# Patient Record
Sex: FEMALE
[De-identification: names, ages, dates, MRNs, and addresses within clinical notes are randomized per-mention and may not be internally consistent; named-entity substitution may affect disease eponyms.]

---

## 2017-02-27 PROBLEM — Z00.00 ENCOUNTER FOR PREVENTIVE HEALTH EXAMINATION: Status: ACTIVE | Noted: 2017-02-27

## 2017-03-22 ENCOUNTER — APPOINTMENT (OUTPATIENT)
Dept: CARDIOLOGY | Facility: CLINIC | Age: 82
End: 2017-03-22

## 2017-07-21 ENCOUNTER — APPOINTMENT (OUTPATIENT)
Dept: CARDIOLOGY | Facility: CLINIC | Age: 82
End: 2017-07-21

## 2017-08-02 ENCOUNTER — APPOINTMENT (OUTPATIENT)
Dept: CARDIOLOGY | Facility: CLINIC | Age: 82
End: 2017-08-02
Payer: MEDICARE

## 2017-08-02 PROCEDURE — 99214 OFFICE O/P EST MOD 30 MIN: CPT

## 2017-10-04 ENCOUNTER — APPOINTMENT (OUTPATIENT)
Dept: CARDIOLOGY | Facility: CLINIC | Age: 82
End: 2017-10-04
Payer: MEDICARE

## 2017-10-04 PROCEDURE — 85610 PROTHROMBIN TIME: CPT | Mod: QW

## 2017-10-04 PROCEDURE — 99214 OFFICE O/P EST MOD 30 MIN: CPT

## 2017-10-04 PROCEDURE — 93306 TTE W/DOPPLER COMPLETE: CPT

## 2017-10-04 PROCEDURE — 93880 EXTRACRANIAL BILAT STUDY: CPT

## 2018-04-25 ENCOUNTER — APPOINTMENT (OUTPATIENT)
Dept: CARDIOLOGY | Facility: CLINIC | Age: 83
End: 2018-04-25
Payer: MEDICARE

## 2018-04-25 ENCOUNTER — NON-APPOINTMENT (OUTPATIENT)
Age: 83
End: 2018-04-25

## 2018-04-25 VITALS
OXYGEN SATURATION: 97 % | BODY MASS INDEX: 20.41 KG/M2 | SYSTOLIC BLOOD PRESSURE: 94 MMHG | DIASTOLIC BLOOD PRESSURE: 60 MMHG | HEART RATE: 96 BPM | WEIGHT: 127 LBS | RESPIRATION RATE: 16 BRPM | HEIGHT: 66 IN

## 2018-04-25 DIAGNOSIS — Z86.79 PERSONAL HISTORY OF OTHER DISEASES OF THE CIRCULATORY SYSTEM: ICD-10-CM

## 2018-04-25 DIAGNOSIS — Z87.898 PERSONAL HISTORY OF OTHER SPECIFIED CONDITIONS: ICD-10-CM

## 2018-04-25 DIAGNOSIS — I65.29 OCCLUSION AND STENOSIS OF UNSPECIFIED CAROTID ARTERY: ICD-10-CM

## 2018-04-25 DIAGNOSIS — Z86.59 PERSONAL HISTORY OF OTHER MENTAL AND BEHAVIORAL DISORDERS: ICD-10-CM

## 2018-04-25 DIAGNOSIS — Z86.39 PERSONAL HISTORY OF OTHER ENDOCRINE, NUTRITIONAL AND METABOLIC DISEASE: ICD-10-CM

## 2018-04-25 DIAGNOSIS — Z82.49 FAMILY HISTORY OF ISCHEMIC HEART DISEASE AND OTHER DISEASES OF THE CIRCULATORY SYSTEM: ICD-10-CM

## 2018-04-25 DIAGNOSIS — Z80.6 FAMILY HISTORY OF LEUKEMIA: ICD-10-CM

## 2018-04-25 DIAGNOSIS — Z87.891 PERSONAL HISTORY OF NICOTINE DEPENDENCE: ICD-10-CM

## 2018-04-25 DIAGNOSIS — E03.9 HYPOTHYROIDISM, UNSPECIFIED: ICD-10-CM

## 2018-04-25 PROCEDURE — 93000 ELECTROCARDIOGRAM COMPLETE: CPT

## 2018-04-25 PROCEDURE — 99214 OFFICE O/P EST MOD 30 MIN: CPT

## 2018-04-25 RX ORDER — METFORMIN ER 500 MG 500 MG/1
500 TABLET ORAL
Qty: 90 | Refills: 0 | Status: ACTIVE | COMMUNITY
Start: 2017-11-22

## 2018-10-24 ENCOUNTER — APPOINTMENT (OUTPATIENT)
Dept: CARDIOLOGY | Facility: CLINIC | Age: 83
End: 2018-10-24
Payer: MEDICARE

## 2018-10-24 VITALS
BODY MASS INDEX: 20.57 KG/M2 | HEART RATE: 80 BPM | WEIGHT: 128 LBS | DIASTOLIC BLOOD PRESSURE: 66 MMHG | SYSTOLIC BLOOD PRESSURE: 100 MMHG | HEIGHT: 66 IN

## 2018-10-24 PROCEDURE — 99214 OFFICE O/P EST MOD 30 MIN: CPT

## 2019-04-24 ENCOUNTER — NON-APPOINTMENT (OUTPATIENT)
Age: 84
End: 2019-04-24

## 2019-04-24 ENCOUNTER — APPOINTMENT (OUTPATIENT)
Dept: CARDIOLOGY | Facility: CLINIC | Age: 84
End: 2019-04-24
Payer: MEDICARE

## 2019-04-24 VITALS
DIASTOLIC BLOOD PRESSURE: 50 MMHG | WEIGHT: 130 LBS | HEART RATE: 77 BPM | BODY MASS INDEX: 20.89 KG/M2 | HEIGHT: 66 IN | SYSTOLIC BLOOD PRESSURE: 90 MMHG

## 2019-04-24 PROCEDURE — 99215 OFFICE O/P EST HI 40 MIN: CPT

## 2019-04-24 PROCEDURE — 93000 ELECTROCARDIOGRAM COMPLETE: CPT

## 2019-04-24 PROCEDURE — 93306 TTE W/DOPPLER COMPLETE: CPT

## 2019-04-24 NOTE — ASSESSMENT
[FreeTextEntry1] : \par past tests for reference: \par \par Carotid Doppler study. In October 4, 2017. Nonobstructive disease.\par Echocardiogram  October 4, 2017. Preserved ejection fraction. Mild worsening of aortic stenosis and increased pulmonary pressures. Stable mitral regurgitation\par \par  Reviewed labs from July 7, 2017. INR 2.7. CBC is stable. Creatinine 0.98. Sodium 139. Potassium 5.0.\par \par Echocardiogram.  8/5/2015 EF 55%.  Biatrial enlargement.  Right ventricular enlargement.  Mild mitral regurgitation mild to moderate tricuspid regurgitation mild aortic stenosis.  Overall no significant change compared to before.\par \par Echocardiogram 9/7/2016 EF 55%. Biatrial enlargement. Right ventricular enlargement. Normal LV RV function. Mild mitral regurgitation. Moderate tricuspid consideration. Mean aortic gradient 10 mmHg. Aortic valve area 1.37 m².\par \par On April 25 2018 ndication atrial fibrillation. Interpretation by me. Her EKG. Atrial fibrillation. Biventricular hypertrophy. ST-T wave changes, nonspecific.\par \par Reviewed on April 24, 2019\par EKG April 24, 2019. Atrial fibrillation, LVH. Premature ventricular beat. Anteroseptal infarct of undetermined age. Nonspecific ST-T changes.\par Echocardiogram. April 24, 2019 and ejection fraction 65%. Severe aortic stenosis. Dilated aortic root. Biatrial enlargement. Mild pulmonary hypertension.\par

## 2019-04-24 NOTE — PHYSICAL EXAM
[Normal Appearance] : normal appearance [General Appearance - Well Developed] : well developed [Well Groomed] : well groomed [General Appearance - Well Nourished] : well nourished [No Deformities] : no deformities [General Appearance - In No Acute Distress] : no acute distress [Normal Conjunctiva] : the conjunctiva exhibited no abnormalities [No Oral Pallor] : no oral pallor [Normal Jugular Venous A Waves Present] : normal jugular venous A waves present [Normal Jugular Venous V Waves Present] : normal jugular venous V waves present [No Jugular Venous Nicole A Waves] : no jugular venous nicole A waves [Respiration, Rhythm And Depth] : normal respiratory rhythm and effort [Exaggerated Use Of Accessory Muscles For Inspiration] : no accessory muscle use [Auscultation Breath Sounds / Voice Sounds] : lungs were clear to auscultation bilaterally [Heart Sounds] : normal S1 and S2 [Veins - Varicosity Changes] : no varicosital changes were noted in the lower extremities [Abdomen Soft] : soft [FreeTextEntry1] : walks with a walker [Cyanosis, Localized] : no localized cyanosis [Nail Clubbing] : no clubbing of the fingernails [] : no rash [Oriented To Time, Place, And Person] : oriented to person, place, and time [Affect] : the affect was normal [No Anxiety] : not feeling anxious [Mood] : the mood was normal

## 2019-04-24 NOTE — HISTORY OF PRESENT ILLNESS
[FreeTextEntry1] : \par other medical problems as noted\par  \par #1 chronic edema. Related to venous insufficiency as well as possible HFpEF. Stable. Improves in the morning. Increases in the evening. Not associated with any pain. No skin changes. Non-progressive. Stable on present regimen of medication. Unable to use compression stockings.\par \par #2 chronic atrial fibrillation. Rate control. On anticoagulation. Asymptomatic. No bleeding complication.\par \par #3 atherosclerotic coronary artery disease. Native vessel. Asymptomatic. Nonsmoker. On anticoagulation. No history of MI. Preserved LV systolic function. Not on beta blockers. Stable weight. Nonsmoker.\par \par #4 type 2 diabetes mellitus. uncomplicated\par \par #5 dyslipidemia.  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

## 2019-04-24 NOTE — REVIEW OF SYSTEMS
[Feeling Fatigued] : feeling fatigued [Dyspnea on exertion] : dyspnea during exertion [Shortness Of Breath] : shortness of breath [Chest  Pressure] : no chest pressure [Chest Pain] : no chest pain [Lower Ext Edema] : lower extremity edema [Leg Claudication] : no intermittent leg claudication [Palpitations] : no palpitations [Joint Pain] : joint pain [Joint Stiffness] : joint stiffness [Negative] : Endocrine

## 2019-04-24 NOTE — REASON FOR VISIT
[Follow-Up - Clinic] : a clinic follow-up of [Hyperlipidemia] : hyperlipidemia [Atrial Fibrillation] : atrial fibrillation [Hypertension] : hypertension [FreeTextEntry1] : 89-year-old comes in for a followup consultation Review echocardiogram\par Improved ankle edema.\par She has no PND, orthopnea.\par No palpitation, dizziness, chest pain \par Continued fatigue and tiredness. Moderate severity. Daytime sleepiness. Urinary frequency during nighttime does not lead to sleep.  Her shortness of breath is stable. There is no PND or orthopnea. She has no palpitation. She has mild postural dizziness, which resolves on its own after standing for a few minutes. She uses a walker to walk. Her weight is stable. Diet is stable. There is no bleeding complications. No recent hospital admission.

## 2019-04-24 NOTE — DISCUSSION/SUMMARY
[FreeTextEntry1] : #1IReviewed echocardiogram and EKG returned.\par Reviewed severe aortic stenosis, and pathophysiology, recurrent.\par Discuss medical management vs invasive evaluation followed by either percutaneous or open heart procedure for valve replacement.\par Discussed mortality under medical management versus complications and other morbidity, mortality related to invasive intervention X. line. She is decided with continued medical management.\par #2 chronic atrial fibrillation. Weight is stable. Continue diltiazem 120 mg p.o. continue warfarin. Watch for bleeding  I agree between 2 and 3.\par #3 hypertensive heart disease. Stable chronic edema. Follow labs. Continue present medications.\par #4 mild to moderate mitral regurgitation. Aortic stenosis. His elderly systolic function. Mild pulmonary hypertension. Moderate tricuspid regurgitation. Clinically stable. Continue low salt diet. Spironolactone. Takes furosemide intermittently.\par #5 diaabtes controlled.\par \par Counseling regarding low saturated fat, salt and carbohydrate intake was reviewed. Active lifestyle and regular. Exercise along with weight management is advised.\par \par I have reviewed above at length. I answered all the questions. Patient verbalized understanding\par Thank you very much for allowing me to participate in your patient's care. Please feel free to call me for any questions.\par Follow up in 6 months

## 2019-04-26 ENCOUNTER — APPOINTMENT (OUTPATIENT)
Dept: CARDIOLOGY | Facility: CLINIC | Age: 84
End: 2019-04-26

## 2019-10-30 ENCOUNTER — APPOINTMENT (OUTPATIENT)
Dept: CARDIOLOGY | Facility: CLINIC | Age: 84
End: 2019-10-30
Payer: MEDICARE

## 2019-10-30 VITALS
BODY MASS INDEX: 20.57 KG/M2 | HEIGHT: 66 IN | DIASTOLIC BLOOD PRESSURE: 62 MMHG | HEART RATE: 82 BPM | SYSTOLIC BLOOD PRESSURE: 110 MMHG | OXYGEN SATURATION: 96 % | WEIGHT: 128 LBS

## 2019-10-30 PROCEDURE — 99214 OFFICE O/P EST MOD 30 MIN: CPT

## 2019-10-30 NOTE — DISCUSSION/SUMMARY
[FreeTextEntry1] : #1Reviewed severe aortic stenosis, and pathophysiology, recurrent.\par She continued to maintain management plan as medical management. Understanding worsening congestive heart failure, syncope, and death\par #2 chronic atrial fibrillation. Weight is stable. Continue diltiazem 120 mg p.o. continue warfarin. Watch for bleeding  I agree between 2 and 3.\par #3 hypertensive heart disease. Stable chronic edema. Follow labs. Continue present medications.\par #4 mild to moderate mitral regurgitation. Aortic stenosis. His elderly systolic function. Mild pulmonary hypertension. Moderate tricuspid regurgitation. Clinically stable. Continue low salt diet. Spironolactone. Takes furosemide intermittently.\par #5 diabetes controlled.\par Labs ordered\par \par Counseling regarding low saturated fat, salt and carbohydrate intake was reviewed. Active lifestyle and regular. Exercise along with weight management is advised.\par \par I have reviewed above at length. I answered all the questions. Patient verbalized understanding\par Thank you very much for allowing me to participate in your patient's care. Please feel free to call me for any questions.\par Follow up in 6 months

## 2019-10-30 NOTE — HISTORY OF PRESENT ILLNESS
[FreeTextEntry1] : \par other medical problems as noted\par  \par #1 chronic edema. Related to venous insufficiency as well as possible HFpEF. Stable. Improves in the morning. Increases in the evening. Not associated with any pain. No skin changes. Non-progressive. Stable on present regimen of medication. Unable to use compression stockings.\par \par #2 chronic atrial fibrillation. Rate control. On anticoagulation. Asymptomatic. No bleeding complication.\par \par #3 atherosclerotic coronary artery disease. Native vessel. Asymptomatic. Nonsmoker. On anticoagulation. No history of MI. Preserved LV systolic function. Not on beta blockers. Stable weight. Nonsmoker.\par \par #4 type 2 diabetes mellitus. uncomplicated\par \par #5 dyslipidemia.  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 
None

## 2019-10-30 NOTE — REVIEW OF SYSTEMS
[Feeling Fatigued] : feeling fatigued [Shortness Of Breath] : shortness of breath [Dyspnea on exertion] : dyspnea during exertion [Lower Ext Edema] : lower extremity edema [Joint Pain] : joint pain [Joint Stiffness] : joint stiffness [Negative] : Heme/Lymph [Abdominal Pain] : abdominal pain [Chest  Pressure] : no chest pressure [Chest Pain] : no chest pain [Leg Claudication] : no intermittent leg claudication [Palpitations] : no palpitations [Nausea] : no nausea [Vomiting] : no vomiting [Heartburn] : no heartburn [Change in Appetite] : no change in appetite [Change In The Stool] : no change in stool [Dysphagia] : no dysphagia

## 2019-10-30 NOTE — PHYSICAL EXAM
[General Appearance - Well Developed] : well developed [Normal Appearance] : normal appearance [Well Groomed] : well groomed [General Appearance - Well Nourished] : well nourished [No Deformities] : no deformities [General Appearance - In No Acute Distress] : no acute distress [Normal Conjunctiva] : the conjunctiva exhibited no abnormalities [No Oral Pallor] : no oral pallor [Normal Jugular Venous A Waves Present] : normal jugular venous A waves present [Normal Jugular Venous V Waves Present] : normal jugular venous V waves present [No Jugular Venous Nicole A Waves] : no jugular venous nicole A waves [Respiration, Rhythm And Depth] : normal respiratory rhythm and effort [Exaggerated Use Of Accessory Muscles For Inspiration] : no accessory muscle use [Auscultation Breath Sounds / Voice Sounds] : lungs were clear to auscultation bilaterally [Heart Sounds] : normal S1 and S2 [Veins - Varicosity Changes] : no varicosital changes were noted in the lower extremities [Nail Clubbing] : no clubbing of the fingernails [Cyanosis, Localized] : no localized cyanosis [] : no rash [Oriented To Time, Place, And Person] : oriented to person, place, and time [Affect] : the affect was normal [Mood] : the mood was normal [No Anxiety] : not feeling anxious [FreeTextEntry1] : walks with a walker

## 2019-10-30 NOTE — REASON FOR VISIT
[Follow-Up - Clinic] : a clinic follow-up of [Atrial Fibrillation] : atrial fibrillation [Hyperlipidemia] : hyperlipidemia [Hypertension] : hypertension [FreeTextEntry1] : 89-year-old comes in for a followup consultation \par Improved ankle edema.\par She has no PND, orthopnea.\par No palpitation, dizziness, chest pain \par Continued fatigue and tiredness. Moderate severity. Daytime sleepiness. Urinary frequency during nighttime does not lead to sleep.  Her shortness of breath is stable. There is no PND or orthopnea. She has no palpitation. She has mild postural dizziness, which resolves on its own after standing for a few minutes. She uses a walker to walk. Her weight is stable. Diet is stable. There is no bleeding complications. No recent hospital admission.\par She had significant abdominal pain. No nausea, vomiting, or diarrhea. No weight loss. Being evaluated. Mild to moderate severity.

## 2019-10-30 NOTE — ASSESSMENT
[FreeTextEntry1] : past tests for reference: \par \par Carotid Doppler study. In October 4, 2017. Nonobstructive disease.\par Echocardiogram  October 4, 2017. Preserved ejection fraction. Mild worsening of aortic stenosis and increased pulmonary pressures. Stable mitral regurgitation\par \par  Reviewed labs from July 7, 2017. INR 2.7. CBC is stable. Creatinine 0.98. Sodium 139. Potassium 5.0.\par \par Echocardiogram.  8/5/2015 EF 55%.  Biatrial enlargement.  Right ventricular enlargement.  Mild mitral regurgitation mild to moderate tricuspid regurgitation mild aortic stenosis.  Overall no significant change compared to before.\par \par Echocardiogram 9/7/2016 EF 55%. Biatrial enlargement. Right ventricular enlargement. Normal LV RV function. Mild mitral regurgitation. Moderate tricuspid consideration. Mean aortic gradient 10 mmHg. Aortic valve area 1.37 m².\par \par On April 25 2018 ndication atrial fibrillation. Interpretation by me. Her EKG. Atrial fibrillation. Biventricular hypertrophy. ST-T wave changes, nonspecific.\par \par Reviewed on April 24, 2019\par EKG April 24, 2019. Atrial fibrillation, LVH. Premature ventricular beat. Anteroseptal infarct of undetermined age. Nonspecific ST-T changes.\par Echocardiogram. April 24, 2019 and ejection fraction 65%. Severe aortic stenosis. Dilated aortic root. Biatrial enlargement. Mild pulmonary hypertension.\par

## 2020-01-17 ENCOUNTER — APPOINTMENT (OUTPATIENT)
Dept: CARDIOLOGY | Facility: CLINIC | Age: 85
End: 2020-01-17
Payer: MEDICARE

## 2020-01-17 VITALS
WEIGHT: 125 LBS | OXYGEN SATURATION: 96 % | HEIGHT: 66 IN | BODY MASS INDEX: 20.09 KG/M2 | DIASTOLIC BLOOD PRESSURE: 60 MMHG | SYSTOLIC BLOOD PRESSURE: 110 MMHG | HEART RATE: 80 BPM

## 2020-01-17 VITALS — DIASTOLIC BLOOD PRESSURE: 60 MMHG | SYSTOLIC BLOOD PRESSURE: 100 MMHG

## 2020-01-17 DIAGNOSIS — I95.1 ORTHOSTATIC HYPOTENSION: ICD-10-CM

## 2020-01-17 DIAGNOSIS — R42 DIZZINESS AND GIDDINESS: ICD-10-CM

## 2020-01-17 PROCEDURE — 99215 OFFICE O/P EST HI 40 MIN: CPT

## 2020-01-17 RX ORDER — LEVOTHYROXINE SODIUM 0.1 MG/1
100 TABLET ORAL
Qty: 90 | Refills: 0 | Status: ACTIVE | COMMUNITY
Start: 2019-08-05

## 2020-01-17 NOTE — ASSESSMENT
[FreeTextEntry1] : past tests for reference: \par \par Carotid Doppler study. In October 4, 2017. Nonobstructive disease.\par Echocardiogram  October 4, 2017. Preserved ejection fraction. Mild worsening of aortic stenosis and increased pulmonary pressures. Stable mitral regurgitation\par \par  Reviewed labs from July 7, 2017. INR 2.7. CBC is stable. Creatinine 0.98. Sodium 139. Potassium 5.0.\par \par Echocardiogram.  8/5/2015 EF 55%.  Biatrial enlargement.  Right ventricular enlargement.  Mild mitral regurgitation mild to moderate tricuspid regurgitation mild aortic stenosis.  Overall no significant change compared to before.\par \par Echocardiogram 9/7/2016 EF 55%. Biatrial enlargement. Right ventricular enlargement. Normal LV RV function. Mild mitral regurgitation. Moderate tricuspid consideration. Mean aortic gradient 10 mmHg. Aortic valve area 1.37 m².\par \par On April 25 2018 ndication atrial fibrillation. Interpretation by me. Her EKG. Atrial fibrillation. Biventricular hypertrophy. ST-T wave changes, nonspecific.\par \par Reviewed on April 24, 2019\par EKG April 24, 2019. Atrial fibrillation, LVH. Premature ventricular beat. Anteroseptal infarct of undetermined age. Nonspecific ST-T changes.\par Echocardiogram. April 24, 2019 and ejection fraction 65%. Severe aortic stenosis. Dilated aortic root. Biatrial enlargement. Mild pulmonary hypertension.\par \par Reviewed January 17, 2020\par EKG from outside office showed atrial fibrillation stable ventricular rate LVH inferolateral ST-T changes suggestive of ischemia.  No significant new changes\par Labs have not been done recently\par

## 2020-01-17 NOTE — DISCUSSION/SUMMARY
[FreeTextEntry1] : #1  Dizziness.  Mild postural shifts in blood pressure.  EKG with stable ventricular rate in presence of chronic A. fib.  She has significant aortic stenosis which is being managed medically.  There is no overt bleeding noted.\par At present I recommended her to have CBC BMP\par 24-hour Holter monitor to rule out any significant bradycardia arrhythmias\par Repeat echocardiogram to see if any significant change in aortic valve gradient\par Increase fluid intake\par Discontinue Spironolactone\par If any significant worsening recommended to call 911\par #2 chronic atrial fibrillation. Weight is stable. Continue diltiazem 120 mg continue warfarin. Watch for bleeding  INR goal between 2 and 3.\par #3 hypertensive heart disease. Stable chronic edema. Follow labs. Continue present medications.\par #4 mild to moderate mitral regurgitation. Aortic stenosis. His elderly systolic function. Mild pulmonary hypertension. Moderate tricuspid regurgitation. Clinically stable. Continue low salt diet.  We will hold off with spironolactone and furosemide at present in presence of postural changes in blood pressure and dizziness.\par #5 diabetes controlled.\par \par Counseling regarding low saturated fat, salt and carbohydrate intake was reviewed. Active lifestyle and regular. Exercise along with weight management is advised.\par \par I have reviewed above at length. I answered all the questions. Patient verbalized understanding\par Thank you very much for allowing me to participate in your patient's care. Please feel free to call me for any questions.\par Follow up in 1 week

## 2020-01-17 NOTE — PHYSICAL EXAM
[General Appearance - Well Developed] : well developed [Normal Appearance] : normal appearance [Well Groomed] : well groomed [No Deformities] : no deformities [General Appearance - Well Nourished] : well nourished [General Appearance - In No Acute Distress] : no acute distress [Normal Conjunctiva] : the conjunctiva exhibited no abnormalities [Normal Jugular Venous A Waves Present] : normal jugular venous A waves present [Normal Jugular Venous V Waves Present] : normal jugular venous V waves present [No Oral Pallor] : no oral pallor [No Jugular Venous Nicole A Waves] : no jugular venous nicole A waves [Respiration, Rhythm And Depth] : normal respiratory rhythm and effort [Exaggerated Use Of Accessory Muscles For Inspiration] : no accessory muscle use [Auscultation Breath Sounds / Voice Sounds] : lungs were clear to auscultation bilaterally [Abdomen Soft] : soft [Veins - Varicosity Changes] : no varicosital changes were noted in the lower extremities [Heart Sounds] : normal S1 and S2 [Cyanosis, Localized] : no localized cyanosis [FreeTextEntry1] : walks with a walker [Nail Clubbing] : no clubbing of the fingernails [Oriented To Time, Place, And Person] : oriented to person, place, and time [Affect] : the affect was normal [] : no rash [Mood] : the mood was normal [No Anxiety] : not feeling anxious

## 2020-01-17 NOTE — REASON FOR VISIT
[Atrial Fibrillation] : atrial fibrillation [Dizziness] : dizziness [Acute Exacerbation] : an acute exacerbation of [Hyperlipidemia] : hyperlipidemia [Hypertension] : hypertension [FreeTextEntry1] : 89-year-old comes in to the office for emergent visit because of worsening dizziness over the last few weeks.\par She had episode of significant diaphoresis overnight without any fever or chills few days ago.\par She has been feeling dizzy lightheaded without vertiginous symptoms but with mild nausea over the last few days which got worse over the last 24 hours.  It got worse with standing up or doing activity.  There is no associated palpitation headache focal weakness.  She has no significant chest pain\par Her weight is stable with stable mild ankle edema.\par She has no PND orthopnea\par She has no hemoptysis hematemesis melena \par \par Continued fatigue and tiredness. Moderate severity. Daytime sleepiness. Urinary frequency during nighttime does not lead to sleep.  Her shortness of breath is stable\par

## 2020-01-17 NOTE — HISTORY OF PRESENT ILLNESS
[FreeTextEntry1] : other medical problems as noted\par  \par #1 chronic edema. Related to venous insufficiency as well as possible HFpEF. Stable. Improves in the morning. Increases in the evening. Not associated with any pain. No skin changes. Non-progressive. Stable on present regimen of medication. Unable to use compression stockings.\par \par #2 chronic atrial fibrillation. Rate control. On anticoagulation. Asymptomatic. No bleeding complication.\par \par #3 atherosclerotic coronary artery disease. Native vessel. Asymptomatic. Nonsmoker. On anticoagulation. No history of MI. Preserved LV systolic function. Not on beta blockers. Stable weight. Nonsmoker.\par \par #4 type 2 diabetes mellitus. uncomplicated\par \par #5 dyslipidemia.  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

## 2020-01-17 NOTE — REVIEW OF SYSTEMS
[Feeling Fatigued] : feeling fatigued [Shortness Of Breath] : shortness of breath [Dyspnea on exertion] : dyspnea during exertion [Chest  Pressure] : no chest pressure [Leg Claudication] : no intermittent leg claudication [Lower Ext Edema] : lower extremity edema [Chest Pain] : no chest pain [Palpitations] : no palpitations [Abdominal Pain] : abdominal pain [Vomiting] : no vomiting [Nausea] : no nausea [Heartburn] : no heartburn [Dysphagia] : no dysphagia [Change In The Stool] : no change in stool [Change in Appetite] : no change in appetite [see HPI] : see HPI [Joint Pain] : joint pain [Joint Stiffness] : joint stiffness [Tremor] : no tremor was seen [Dizziness] : dizziness [Numbness (Hypesthesia)] : no numbness [Convulsions] : no convulsions [Tingling (Paresthesia)] : no tingling [Negative] : Endocrine

## 2020-01-24 ENCOUNTER — APPOINTMENT (OUTPATIENT)
Dept: CARDIOLOGY | Facility: CLINIC | Age: 85
End: 2020-01-24

## 2020-01-31 PROCEDURE — 93224 XTRNL ECG REC UP TO 48 HRS: CPT

## 2020-02-26 ENCOUNTER — APPOINTMENT (OUTPATIENT)
Dept: CARDIOLOGY | Facility: CLINIC | Age: 85
End: 2020-02-26
Payer: MEDICARE

## 2020-02-26 VITALS
OXYGEN SATURATION: 91 % | DIASTOLIC BLOOD PRESSURE: 60 MMHG | BODY MASS INDEX: 21.69 KG/M2 | HEART RATE: 82 BPM | WEIGHT: 135 LBS | HEIGHT: 66 IN | SYSTOLIC BLOOD PRESSURE: 104 MMHG

## 2020-02-26 PROCEDURE — 99215 OFFICE O/P EST HI 40 MIN: CPT

## 2020-02-26 PROCEDURE — 93306 TTE W/DOPPLER COMPLETE: CPT

## 2020-02-26 NOTE — PHYSICAL EXAM
[General Appearance - Well Developed] : well developed [General Appearance - Well Nourished] : well nourished [Normal Appearance] : normal appearance [Well Groomed] : well groomed [No Deformities] : no deformities [General Appearance - In No Acute Distress] : no acute distress [Normal Conjunctiva] : the conjunctiva exhibited no abnormalities [Normal Jugular Venous A Waves Present] : normal jugular venous A waves present [No Oral Pallor] : no oral pallor [Normal Jugular Venous V Waves Present] : normal jugular venous V waves present [No Jugular Venous Nicole A Waves] : no jugular venous nicole A waves [Exaggerated Use Of Accessory Muscles For Inspiration] : no accessory muscle use [Respiration, Rhythm And Depth] : normal respiratory rhythm and effort [Heart Sounds] : normal S1 and S2 [Auscultation Breath Sounds / Voice Sounds] : lungs were clear to auscultation bilaterally [Veins - Varicosity Changes] : no varicosital changes were noted in the lower extremities [Abdomen Soft] : soft [Cyanosis, Localized] : no localized cyanosis [Nail Clubbing] : no clubbing of the fingernails [] : no rash [Affect] : the affect was normal [Oriented To Time, Place, And Person] : oriented to person, place, and time [Mood] : the mood was normal [No Anxiety] : not feeling anxious [FreeTextEntry1] : walks with a walker

## 2020-02-26 NOTE — REVIEW OF SYSTEMS
[Feeling Fatigued] : feeling fatigued [Shortness Of Breath] : shortness of breath [Dyspnea on exertion] : dyspnea during exertion [Lower Ext Edema] : lower extremity edema [Abdominal Pain] : abdominal pain [Joint Pain] : joint pain [Joint Stiffness] : joint stiffness [Dizziness] : dizziness [see HPI] : see HPI [Negative] : Heme/Lymph [Chest  Pressure] : no chest pressure [Leg Claudication] : no intermittent leg claudication [Chest Pain] : no chest pain [Palpitations] : no palpitations [Nausea] : no nausea [Vomiting] : no vomiting [Heartburn] : no heartburn [Change In The Stool] : no change in stool [Change in Appetite] : no change in appetite [Dysphagia] : no dysphagia [Tremor] : no tremor was seen [Numbness (Hypesthesia)] : no numbness [Tingling (Paresthesia)] : no tingling [Convulsions] : no convulsions

## 2020-02-26 NOTE — ASSESSMENT
[FreeTextEntry1] : past tests for reference: \par \par Carotid Doppler study. In October 4, 2017. Nonobstructive disease.\par Echocardiogram  October 4, 2017. Preserved ejection fraction. Mild worsening of aortic stenosis and increased pulmonary pressures. Stable mitral regurgitation\par \par  Reviewed labs from July 7, 2017. INR 2.7. CBC is stable. Creatinine 0.98. Sodium 139. Potassium 5.0.\par \par Echocardiogram.  8/5/2015 EF 55%.  Biatrial enlargement.  Right ventricular enlargement.  Mild mitral regurgitation mild to moderate tricuspid regurgitation mild aortic stenosis.  Overall no significant change compared to before.\par \par Echocardiogram 9/7/2016 EF 55%. Biatrial enlargement. Right ventricular enlargement. Normal LV RV function. Mild mitral regurgitation. Moderate tricuspid consideration. Mean aortic gradient 10 mmHg. Aortic valve area 1.37 m².\par \par On April 25 2018 ndication atrial fibrillation. Interpretation by me. Her EKG. Atrial fibrillation. Biventricular hypertrophy. ST-T wave changes, nonspecific.\par \par Reviewed on April 24, 2019\par EKG April 24, 2019. Atrial fibrillation, LVH. Premature ventricular beat. Anteroseptal infarct of undetermined age. Nonspecific ST-T changes.\par Echocardiogram. April 24, 2019 and ejection fraction 65%. Severe aortic stenosis. Dilated aortic root. Biatrial enlargement. Mild pulmonary hypertension.\par \par Reviewed January 17, 2020\par EKG from outside office showed atrial fibrillation stable ventricular rate LVH inferolateral ST-T changes suggestive of ischemia.  No significant new changes\par Labs have not been done recently\par \par Reviewed on February 26, 2020\par Echocardiogram.  LV ejection fraction around 50%.  Right ventricular right atrial enlargement.  Pulmonary hypertension.\par Holter monitor January 17, 2020 atrial fibrillation.  Maximum pauses of 2 to 2.2 seconds.

## 2020-02-26 NOTE — DISCUSSION/SUMMARY
[FreeTextEntry1] : #1  Dizziness.  Resolved though it led to significant worsening edema of lower extremity and presence of right heart failure associated with pulmonary hypertension.\par She will go back on her furosemide and Spironolactone\par She will have repeat labs with BMP\par #2 chronic atrial fibrillation. Weight is stable. Continue diltiazem 120 mg continue warfarin. Watch for bleeding  INR goal between 2 and 3.  She is on high risk medications without any significant toxicity.  Follow CBC BMP closely.\par #3 hypertensive heart disease. Continue present medications.\par #4 mild to moderate mitral regurgitation. Aortic stenosis.  Preserved systolic function. pulmonary hypertension. Moderate tricuspid regurgitation. Clinically stable. Continue low salt diet.  Diuretics have been restarted.\par #5 diabetes controlled.\par \par Counseling regarding low saturated fat, salt and carbohydrate intake was reviewed. Active lifestyle and regular. Exercise along with weight management is advised.\par \par I have reviewed above at length. I answered all the questions. Patient verbalized understanding\par Thank you very much for allowing me to participate in your patient's care. Please feel free to call me for any questions.\par Follow up in 6 months or for any change in her symptoms.  She is recommended to follow-up with your office on a regular basis as it is easier for her to travel.

## 2020-02-26 NOTE — REASON FOR VISIT
[Dizziness] : dizziness [Atrial Fibrillation] : atrial fibrillation [Hyperlipidemia] : hyperlipidemia [Hypertension] : hypertension [Follow-Up - Clinic] : a clinic follow-up of [FreeTextEntry1] : 89-year-old comes in to the office for f/u echo and worsening edema.  I had stopped her spironolactone and furosemide.\par She has not had any significant dizziness\par She has significant edema all the way to the thigh region.  And worsening nocturnal dyspnea.\par She has no chest pain or palpitations.\par She has no PND orthopnea\par She has no hemoptysis hematemesis melena \par \par Continued fatigue and tiredness. Moderate severity. Daytime sleepiness. Urinary frequency during nighttime does not lead to sleep.  Her shortness of breath is stable\par

## 2020-08-07 ENCOUNTER — RX RENEWAL (OUTPATIENT)
Age: 85
End: 2020-08-07

## 2020-09-08 ENCOUNTER — NON-APPOINTMENT (OUTPATIENT)
Age: 85
End: 2020-09-08

## 2020-09-28 ENCOUNTER — APPOINTMENT (OUTPATIENT)
Dept: CARDIOLOGY | Facility: CLINIC | Age: 85
End: 2020-09-28

## 2021-04-16 ENCOUNTER — NON-APPOINTMENT (OUTPATIENT)
Age: 86
End: 2021-04-16

## 2021-04-30 ENCOUNTER — APPOINTMENT (OUTPATIENT)
Dept: CARDIOLOGY | Facility: CLINIC | Age: 86
End: 2021-04-30
Payer: MEDICARE

## 2021-04-30 ENCOUNTER — NON-APPOINTMENT (OUTPATIENT)
Age: 86
End: 2021-04-30

## 2021-04-30 VITALS
TEMPERATURE: 96.8 F | WEIGHT: 124 LBS | DIASTOLIC BLOOD PRESSURE: 80 MMHG | OXYGEN SATURATION: 96 % | HEIGHT: 66 IN | BODY MASS INDEX: 19.93 KG/M2 | SYSTOLIC BLOOD PRESSURE: 104 MMHG | HEART RATE: 109 BPM

## 2021-04-30 PROCEDURE — 93242 EXT ECG>48HR<7D RECORDING: CPT

## 2021-04-30 PROCEDURE — 99215 OFFICE O/P EST HI 40 MIN: CPT

## 2021-04-30 RX ORDER — WARFARIN 7.5 MG/1
7.5 TABLET ORAL
Qty: 90 | Refills: 0 | Status: DISCONTINUED | COMMUNITY
Start: 2019-07-09 | End: 2021-04-30

## 2021-04-30 RX ORDER — WARFARIN 1 MG/1
1 TABLET ORAL
Qty: 15 | Refills: 0 | Status: DISCONTINUED | COMMUNITY
Start: 2019-07-29 | End: 2021-04-30

## 2021-04-30 RX ORDER — LEVOTHYROXINE SODIUM 0.12 MG/1
125 TABLET ORAL
Qty: 90 | Refills: 0 | Status: DISCONTINUED | COMMUNITY
Start: 2018-04-16 | End: 2021-04-30

## 2021-04-30 RX ORDER — SPIRONOLACTONE 25 MG/1
25 TABLET ORAL
Qty: 90 | Refills: 1 | Status: DISCONTINUED | COMMUNITY
Start: 2017-11-08 | End: 2021-04-30

## 2021-04-30 RX ORDER — CHLORHEXIDINE GLUCONATE 1.2 MG/ML
0.12 RINSE ORAL
Qty: 473 | Refills: 0 | Status: DISCONTINUED | COMMUNITY
Start: 2018-01-09 | End: 2021-04-30

## 2021-04-30 RX ORDER — LATANOPROST/PF 0.005 %
0.01 DROPS OPHTHALMIC (EYE)
Qty: 8 | Refills: 0 | Status: DISCONTINUED | COMMUNITY
Start: 2017-01-25 | End: 2021-04-30

## 2021-04-30 RX ORDER — FAMOTIDINE 20 MG/1
20 TABLET, FILM COATED ORAL
Qty: 180 | Refills: 0 | Status: DISCONTINUED | COMMUNITY
Start: 2020-02-04 | End: 2021-04-30

## 2021-04-30 NOTE — DISCUSSION/SUMMARY
[FreeTextEntry1] : JOEL SOARES is a 89 year old F who presents today Apr 30, 2021 with the above history and the following active issues:\par \par \par Hyopoxia 2/2 HF exacerbation from afib. Continue Dilt 120mg daily. Continue Toprol XL 100mg daily. Obtain Zio monitor to assess for rate control on this regimen. Continue Furosemide 40mg daily. \par \par Possible syncopal episode. Will obtain carotids.\par \par chronic atrial fibrillation. Dilt and Toprol XL as above. On Coumadin. INRs are monitored with PCP.\par \par hypertensive heart disease. Continue present medications.\par \par mild to moderate mitral regurgitation. Aortic stenosis.  Preserved systolic function. pulmonary hypertension. Moderate tricuspid regurgitation. Clinically stable. Continue low salt diet.  Significant murmur on exam. Would recommend repeating echo with our office.\par \par Diabetes controlled.\par \par Now on 2LNC: Pulm referral placed.\par \par Dysphagia: GI referral has been placed.\par \par Ongoing f/u with PCP.\par \par \par F/U after testing to review results (echo, carotid, Zio).\par Discussed red flag symptoms, which would warrant sooner or emergent medical evaluation.\par Any questions and concerns were addressed and resolved.\par \par Sincerely,\par Opal Soriano [facility]-BC\par Patient's history, testing, and plan was reviewed with supervising physician, Dr. Patrick Valentino

## 2021-04-30 NOTE — PHYSICAL EXAM
[Well Developed] : well developed [Well Nourished] : well nourished [No Acute Distress] : no acute distress [Normal Venous Pressure] : normal venous pressure [No Carotid Bruit] : no carotid bruit [Normal S1, S2] : normal S1, S2 [No Murmur] : no murmur [No Rub] : no rub [No Gallop] : no gallop [Clear Lung Fields] : clear lung fields [Good Air Entry] : good air entry [No Respiratory Distress] : no respiratory distress  [Soft] : abdomen soft [Non Tender] : non-tender [No Masses/organomegaly] : no masses/organomegaly [Normal Bowel Sounds] : normal bowel sounds [Normal Gait] : normal gait [No Edema] : no edema [No Cyanosis] : no cyanosis [No Clubbing] : no clubbing [No Varicosities] : no varicosities [No Rash] : no rash [No Skin Lesions] : no skin lesions [Moves all extremities] : moves all extremities [No Focal Deficits] : no focal deficits [Normal Speech] : normal speech [Alert and Oriented] : alert and oriented [Normal memory] : normal memory [General Appearance - Well Developed] : well developed [Normal Appearance] : normal appearance [Well Groomed] : well groomed [General Appearance - Well Nourished] : well nourished [No Deformities] : no deformities [General Appearance - In No Acute Distress] : no acute distress [Normal Conjunctiva] : the conjunctiva exhibited no abnormalities [No Oral Pallor] : no oral pallor [Normal Jugular Venous A Waves Present] : normal jugular venous A waves present [Normal Jugular Venous V Waves Present] : normal jugular venous V waves present [No Jugular Venous Nicole A Waves] : no jugular venous nicole A waves [Respiration, Rhythm And Depth] : normal respiratory rhythm and effort [Exaggerated Use Of Accessory Muscles For Inspiration] : no accessory muscle use [Auscultation Breath Sounds / Voice Sounds] : lungs were clear to auscultation bilaterally [Heart Sounds] : normal S1 and S2 [Veins - Varicosity Changes] : no varicosital changes were noted in the lower extremities [Abdomen Soft] : soft [Nail Clubbing] : no clubbing of the fingernails [Cyanosis, Localized] : no localized cyanosis [] : no rash [Oriented To Time, Place, And Person] : oriented to person, place, and time [Affect] : the affect was normal [Mood] : the mood was normal [No Anxiety] : not feeling anxious [FreeTextEntry1] : walks with a walker

## 2021-04-30 NOTE — HISTORY OF PRESENT ILLNESS
[FreeTextEntry1] : JOEL SOARES is a 89 year old female with a past medical history of:\par \par #1 chronic edema. Related to venous insufficiency as well as possible HFpEF. Stable. Improves in the morning. Increases in the evening. Not associated with any pain. No skin changes. Non-progressive. Stable on present regimen of medication. Unable to use compression stockings.\par \par #2 chronic atrial fibrillation. Rate control. On anticoagulation. Asymptomatic. No bleeding complication.\par \par #3 atherosclerotic coronary artery disease. Native vessel. Asymptomatic. Nonsmoker. On anticoagulation. No history of MI. Preserved LV systolic function. Not on beta blockers. Stable weight. Nonsmoker.\par \par #4 type 2 diabetes mellitus. uncomplicated\par \par #5 dyslipidemia.  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 Diaz 2/2020. In the interim, went to [facility] 3/2021. States she was putting laundry away and "next thin you know, I was on the floor". A neighbor found her the next day. She is not sure if she passed out. Brought to [facility] by EMS after found to be hypoxic on NC. Placed on NRB. EKG with afib with RVR. Admitted for hypoxia 2/2 HF exacerabtion from afib. While in ED patient was found to have  BNP of 6062, procal and lactate neg, trops were negative. Given Cardizem and Lasix while in the ED. Echo EF 50%. EKG AF with RVR. INR was supratherapeurtic and she was hyperkalemic. Coumadin and aldactone were held. K and INR normalized. UA and urine cx indicated ecoli infection. Start on Ceftriazone and UTI quickly responded. Lasix was continued until adequate diuresis was achieved. HR was difficult to control. Cardizem and Lopressor were initiated. Cardiology was consulted who recommended icnreasing Cardizema nd Lopressor to achieve better control of HR. Final dosing oc Cardizem was 160mg and Lopressor 100mg. Warfarin was resumed. Patient was hemodynamically stable, afebrile, and determined safe for DC. Presents today, 4/30/21, as a hospital follow up. She denies chest pain, pressure, palpitations, unusual shortness of breath, orthopnea, LE edema, lightheadedness, dizziness, near syncope or syncope. Reports some dysphagia. There is +2-3 BLLE edema on exam. Remote smoking hx, quit in 1984. Was going to the gym prior to her hospitalization without exertional complaints.\par \par Testing:\par \par Labs  4/19/21: Gluc 120, Cr 0.92, Na 141, K 3.9, Ca 9\par \par Labs 3/15/21: WBC 5.89, hgb 13.9, HCT 43.5, plt 193.\par \par Pleural fluid 3/12/21: Negative for malignancy. Reactive mesothelial cells in a background of macrophages, chronic inflammatory cells, and blood.\par \par Technically successful thoracentesis 3/12/21.\par \par CXR 3/11/21: Cardiomegaly. BL pleural effusions likely moderate. CHF is suspected. There are diffuse markings seen throughout both lung fields likely representing chronic changes. No pneumo.\par \par Labs 3/10/21: K 4.5, Cr 0.93, Mag 1.7, AST 28, ALT 23, Trop 0.22, WBC 5.38, Hgb 12.3, HCT 40.8, plt 166, \par \par Echo 3/10/21: EF 50%. Severely dilated LA and right. Mildly reduced RVSF. Moderate AS. Mild MS. Mod MR. Mod TR. Mild VT. Moderately elevated PASP. Possible PFO or small atrial defect. Right pleural effusion.\par \par CXR 3/9/21: Cardiomegaly with BL pleural effusions with PVC suggest CHF / fluid overload; concurrent PNA in the atelectatic lower lobes cannot be excluded.\par Holter 1/17/20: Afib  bpm. Average HR 84 bpm. Pauses 2-2.2 seconds noted. PVC rare to occasional. \par \par Carotids 10/4/17: Nonobstructive.\par \par past tests for reference: \par \par Carotid Doppler study. In October 4, 2017. Nonobstructive disease.\par Echocardiogram October 4, 2017. Preserved ejection fraction. Mild worsening of aortic stenosis and increased pulmonary pressures. Stable mitral regurgitation\par \par  Reviewed labs from July 7, 2017. INR 2.7. CBC is stable. Creatinine 0.98. Sodium 139. Potassium 5.0.\par \par Echocardiogram. 8/5/2015 EF 55%. Biatrial enlargement. Right ventricular enlargement. Mild mitral regurgitation mild to moderate tricuspid regurgitation mild aortic stenosis. Overall no significant change compared to before.\par \par Echocardiogram 9/7/2016 EF 55%. Biatrial enlargement. Right ventricular enlargement. Normal LV RV function. Mild mitral regurgitation. Moderate tricuspid consideration. Mean aortic gradient 10 mmHg. Aortic valve area 1.37 m².\par \par On April 25 2018 ndication atrial fibrillation. Interpretation by me. Her EKG. Atrial fibrillation. Biventricular hypertrophy. ST-T wave changes, nonspecific.\par \par Reviewed on April 24, 2019\par EKG April 24, 2019. Atrial fibrillation, LVH. Premature ventricular beat. Anteroseptal infarct of undetermined age. Nonspecific ST-T changes.\par Echocardiogram. April 24, 2019 and ejection fraction 65%. Severe aortic stenosis. Dilated aortic root. Biatrial enlargement. Mild pulmonary hypertension.\par \par Reviewed January 17, 2020\par EKG from outside office showed atrial fibrillation stable ventricular rate LVH inferolateral ST-T changes suggestive of ischemia. No significant new changes\par Labs have not been done recently\par \par Reviewed on February 26, 2020\par Echocardiogram. LV ejection fraction around 50%. Right ventricular right atrial enlargement. Pulmonary hypertension.\par Holter monitor January 17, 2020 atrial fibrillation. Maximum pauses of 2 to 2.2 seconds. \par \par [employer] 2010: Lexiscan. Nondiag by EKG. Normal myocardial perfusion and wall motion study. Small LV volume is ntoed.\par \par

## 2021-04-30 NOTE — REASON FOR VISIT
[Follow-Up - From Hospitalization] : follow-up of a recent hospitalization for [Dizziness] : dizziness [Hyperlipidemia] : hyperlipidemia [Hypertension] : hypertension

## 2021-05-21 PROCEDURE — 93244 EXT ECG>48HR<7D REV&INTERPJ: CPT

## 2021-06-18 ENCOUNTER — APPOINTMENT (OUTPATIENT)
Dept: CARDIOLOGY | Facility: CLINIC | Age: 86
End: 2021-06-18
Payer: MEDICARE

## 2021-06-18 ENCOUNTER — NON-APPOINTMENT (OUTPATIENT)
Age: 86
End: 2021-06-18

## 2021-06-18 VITALS
DIASTOLIC BLOOD PRESSURE: 60 MMHG | HEIGHT: 66 IN | SYSTOLIC BLOOD PRESSURE: 80 MMHG | WEIGHT: 124 LBS | HEART RATE: 60 BPM | BODY MASS INDEX: 19.93 KG/M2 | OXYGEN SATURATION: 98 %

## 2021-06-18 DIAGNOSIS — I34.2 NONRHEUMATIC MITRAL (VALVE) STENOSIS: ICD-10-CM

## 2021-06-18 DIAGNOSIS — I35.0 NONRHEUMATIC AORTIC (VALVE) STENOSIS: ICD-10-CM

## 2021-06-18 DIAGNOSIS — E11.9 TYPE 2 DIABETES MELLITUS W/OUT COMPLICATIONS: ICD-10-CM

## 2021-06-18 DIAGNOSIS — E78.5 HYPERLIPIDEMIA, UNSPECIFIED: ICD-10-CM

## 2021-06-18 DIAGNOSIS — I34.0 NONRHEUMATIC MITRAL (VALVE) STENOSIS: ICD-10-CM

## 2021-06-18 DIAGNOSIS — I36.1 NONRHEUMATIC TRICUSPID (VALVE) INSUFFICIENCY: ICD-10-CM

## 2021-06-18 PROCEDURE — 93880 EXTRACRANIAL BILAT STUDY: CPT

## 2021-06-18 PROCEDURE — 93306 TTE W/DOPPLER COMPLETE: CPT

## 2021-06-18 PROCEDURE — 99215 OFFICE O/P EST HI 40 MIN: CPT

## 2021-06-18 PROCEDURE — 93000 ELECTROCARDIOGRAM COMPLETE: CPT

## 2021-06-18 NOTE — HISTORY OF PRESENT ILLNESS
[FreeTextEntry1] : JOEL SOARES is a 89 year old female with a past medical history of:\par \par #1 chronic edema. Related to venous insufficiency as well as possible HFpEF. Stable. Improves in the morning. Increases in the evening. Not associated with any pain. No skin changes. Non-progressive. Stable on present regimen of medication. Unable to use compression stockings.\par \par #2 chronic atrial fibrillation. Rate control. On anticoagulation. Asymptomatic. No bleeding complication.\par \par #3 atherosclerotic coronary artery disease. Native vessel. Asymptomatic. Nonsmoker. On anticoagulation. No history of MI. Preserved LV systolic function. Not on beta blockers. Stable weight. Nonsmoker.\par \par #4 type 2 diabetes mellitus. uncomplicated\par \par #5 dyslipidemia.  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 States she was putting laundry away and "next thin you know, I was on the floor". A neighbor found her the next day. She is not sure if she passed out. Brought to [facility] by EMS after found to be hypoxic on NC. Placed on NRB. EKG with afib with RVR. Admitted for hypoxia 2/2 HF exacerabtion from afib. While in ED patient was found to have BNP of 6062, procal and lactate neg, trops were negative. Given Cardizem and Lasix while in the ED. Echo EF 50%. EKG AF with RVR. INR was supratherapeurtic and she was hyperkalemic. Coumadin and aldactone were held. K and INR normalized. UA and urine cx indicated ecoli infection. Start on Ceftriazone and UTI quickly responded. Lasix was continued until adequate diuresis was achieved. HR was difficult to control. Cardizem and Lopressor were initiated. Cardiology was consulted who recommended increasing Cardizem and Lopressor to achieve better control of HR. Final dosing of Cardizem was 160mg and Lopressor 100mg. Warfarin was resumed. Patient was hemodynamically stable, afebrile, and determined safe for DC.\par \par Last seen 4/3021 with complaints of dysphagia and there was edema on exam. Zio, echo, and carotids were ordered. She was referred to pulm and GI. Presented today, 6/18/21, for her echo and carotid and felt unwell. She is significantly SOB with reports of weakness. There is PND/orthopnea. There is edema. There are reports of fluttering in her chest. Denies CP, syncope, and claudication. Remote smoking hx, quit in 1984. Was going to the gym prior to her hospitalization without exertional complaints.\par \par Testing:\par \par EKG 6/18/21: Afib at 107 bpm, QTc 436 ms, RAD, PVC, PRWP, nonspecific ST-T wave abnormalities \par \par Echo 6/18/21: EF 65%. Mod MR. Mod to severe AS. Severely dilated LA. Mild global LV dysfunction, likely to afib.l Tachycardic during study. Peak LVOT 2mmHg. Afib with DD and elevated left atrial filling pressures. Severe TAE. RVE with normal RVSF. Mild pulm pressures. Large BL pleural effusions. Compared with echo 2/26/20, progression of AS and large BL pleural effusion.\par Carotid 6/18/21: Mild nonobstructive carotid dz BL.\par \par Zio 4/30/21-5/6/21: AF burden 100%, rates  bpm, average HR 84 bpm. 4 episodes of NSVT vs abberancy, longest 15 beats in duration. PVC burden 1%. Rare SVE couplets. V bigem and v trigem present.\par \par Labs  4/19/21: Gluc 120, Cr 0.92, Na 141, K 3.9, Ca 9\par \par Labs 3/15/21: WBC 5.89, hgb 13.9, HCT 43.5, plt 193.\par \par Pleural fluid 3/12/21: Negative for malignancy. Reactive mesothelial cells in a background of macrophages, chronic inflammatory cells, and blood.\par \par Technically successful thoracentesis 3/12/21.\par \par CXR 3/11/21: Cardiomegaly. BL pleural effusions likely moderate. CHF is suspected. There are diffuse markings seen throughout both lung fields likely representing chronic changes. No pneumo.\par \par Labs 3/10/21: K 4.5, Cr 0.93, Mag 1.7, AST 28, ALT 23, Trop 0.22, WBC 5.38, Hgb 12.3, HCT 40.8, plt 166, \par \par Echo 3/10/21: EF 50%. Severely dilated LA and right. Mildly reduced RVSF. Moderate AS. Mild MS. Mod MR. Mod TR. Mild FL. Moderately elevated PASP. Possible PFO or small atrial defect. Right pleural effusion.\par \par CXR 3/9/21: Cardiomegaly with BL pleural effusions with PVC suggest CHF / fluid overload; concurrent PNA in the atelectatic lower lobes cannot be excluded.\par Holter 1/17/20: Afib  bpm. Average HR 84 bpm. Pauses 2-2.2 seconds noted. PVC rare to occasional. \par \par Carotids 10/4/17: Nonobstructive.\par \par past tests for reference: \par \par Carotid Doppler study. In October 4, 2017. Nonobstructive disease.\par Echocardiogram October 4, 2017. Preserved ejection fraction. Mild worsening of aortic stenosis and increased pulmonary pressures. Stable mitral regurgitation\par \par  Reviewed labs from July 7, 2017. INR 2.7. CBC is stable. Creatinine 0.98. Sodium 139. Potassium 5.0.\par \par Echocardiogram. 8/5/2015 EF 55%. Biatrial enlargement. Right ventricular enlargement. Mild mitral regurgitation mild to moderate tricuspid regurgitation mild aortic stenosis. Overall no significant change compared to before.\par \par Echocardiogram 9/7/2016 EF 55%. Biatrial enlargement. Right ventricular enlargement. Normal LV RV function. Mild mitral regurgitation. Moderate tricuspid consideration. Mean aortic gradient 10 mmHg. Aortic valve area 1.37 m².\par \par On April 25 2018 ndication atrial fibrillation. Interpretation by me. Her EKG. Atrial fibrillation. Biventricular hypertrophy. ST-T wave changes, nonspecific.\par \par Reviewed on April 24, 2019\par EKG April 24, 2019. Atrial fibrillation, LVH. Premature ventricular beat. Anteroseptal infarct of undetermined age. Nonspecific ST-T changes.\par Echocardiogram. April 24, 2019 and ejection fraction 65%. Severe aortic stenosis. Dilated aortic root. Biatrial enlargement. Mild pulmonary hypertension.\par \par Reviewed January 17, 2020\par EKG from outside office showed atrial fibrillation stable ventricular rate LVH inferolateral ST-T changes suggestive of ischemia. No significant new changes\par Labs have not been done recently\par \par Reviewed on February 26, 2020\par Echocardiogram. LV ejection fraction around 50%. Right ventricular right atrial enlargement. Pulmonary hypertension.\par Holter monitor January 17, 2020 atrial fibrillation. Maximum pauses of 2 to 2.2 seconds. \par \par [employer] 2010: Lexiscan. Nondiag by EKG. Normal myocardial perfusion and wall motion study. Small LV volume is ntoed.\par \par

## 2021-06-18 NOTE — DISCUSSION/SUMMARY
[FreeTextEntry1] : JOEL SOARES is a 89 year old F who presents today Jun 18, 2021 with the above history and the following active issues:\par \par \par VHD/Heart failure: Progression of AS and large BL pleural effusions noted today. Actively SOB on 2L NC, edematous, crackles on exam. Hypotensive. Recommend emergent eval in ER. 911 called by RN team. Needs IV diuresis. Currently on Furosemide PO.\par \par [facility] ER notified patient will be coming by EMS. Spoke with Concepcion MARTINO.\par \par \par Chronic atrial fibrillation. Dilt and Toprol XL. On Coumadin. INRs are monitored with PCP.\par \par Hypertensive heart disease. Continue present medications.\par \par \par \par Diabetes controlled.\par \par \par Ongoing f/u with PCP.\par \par \par F/U afte hospitalization (televisit with Dr. Diaz)\par Discussed red flag symptoms, which would warrant sooner or emergent medical evaluation.\par Any questions and concerns were addressed and resolved.\par \par Sincerely,\par Opal Soriano [facility]-BC\par Patient's history, testing, and plan was reviewed with supervising physician, Dr. Virgilio Diaz

## 2021-06-18 NOTE — REVIEW OF SYSTEMS
[Lower Ext Edema] : lower extremity edema [Negative] : Heme/Lymph [SOB] : shortness of breath [Dyspnea on exertion] : dyspnea during exertion [PND] : PND

## 2021-06-18 NOTE — PHYSICAL EXAM
[Well Developed] : well developed [Well Nourished] : well nourished [No Acute Distress] : no acute distress [Normal Venous Pressure] : normal venous pressure [No Carotid Bruit] : no carotid bruit [Normal S1, S2] : normal S1, S2 [No Murmur] : no murmur [No Rub] : no rub [No Gallop] : no gallop [Clear Lung Fields] : clear lung fields [Good Air Entry] : good air entry [No Respiratory Distress] : no respiratory distress  [Soft] : abdomen soft [Non Tender] : non-tender [No Masses/organomegaly] : no masses/organomegaly [Normal Bowel Sounds] : normal bowel sounds [Normal Gait] : normal gait [No Edema] : no edema [No Cyanosis] : no cyanosis [No Clubbing] : no clubbing [No Varicosities] : no varicosities [No Rash] : no rash [No Skin Lesions] : no skin lesions [Moves all extremities] : moves all extremities [No Focal Deficits] : no focal deficits [Normal Speech] : normal speech [Normal memory] : normal memory [Alert and Oriented] : alert and oriented [Normal Appearance] : normal appearance [General Appearance - Well Developed] : well developed [Well Groomed] : well groomed [General Appearance - Well Nourished] : well nourished [No Deformities] : no deformities [General Appearance - In No Acute Distress] : no acute distress [Normal Conjunctiva] : the conjunctiva exhibited no abnormalities [No Oral Pallor] : no oral pallor [Normal Jugular Venous A Waves Present] : normal jugular venous A waves present [No Jugular Venous Nicole A Waves] : no jugular venous nicole A waves [Normal Jugular Venous V Waves Present] : normal jugular venous V waves present [Respiration, Rhythm And Depth] : normal respiratory rhythm and effort [Exaggerated Use Of Accessory Muscles For Inspiration] : no accessory muscle use [Auscultation Breath Sounds / Voice Sounds] : lungs were clear to auscultation bilaterally [Heart Sounds] : normal S1 and S2 [Veins - Varicosity Changes] : no varicosital changes were noted in the lower extremities [Abdomen Soft] : soft [Nail Clubbing] : no clubbing of the fingernails [Cyanosis, Localized] : no localized cyanosis [] : no rash [Oriented To Time, Place, And Person] : oriented to person, place, and time [Affect] : the affect was normal [Mood] : the mood was normal [No Anxiety] : not feeling anxious [FreeTextEntry1] : walks with a walker

## 2021-06-25 ENCOUNTER — APPOINTMENT (OUTPATIENT)
Dept: CARDIOLOGY | Facility: CLINIC | Age: 86
End: 2021-06-25

## 2021-08-13 ENCOUNTER — APPOINTMENT (OUTPATIENT)
Dept: CARDIOLOGY | Facility: CLINIC | Age: 86
End: 2021-08-13
Payer: MEDICARE

## 2021-08-13 VITALS
OXYGEN SATURATION: 92 % | BODY MASS INDEX: 21.86 KG/M2 | SYSTOLIC BLOOD PRESSURE: 118 MMHG | HEART RATE: 116 BPM | DIASTOLIC BLOOD PRESSURE: 60 MMHG | HEIGHT: 66 IN | WEIGHT: 136 LBS

## 2021-08-13 DIAGNOSIS — I48.20 CHRONIC ATRIAL FIBRILLATION, UNSP: ICD-10-CM

## 2021-08-13 DIAGNOSIS — I50.33 ACUTE ON CHRONIC DIASTOLIC (CONGESTIVE) HEART FAILURE: ICD-10-CM

## 2021-08-13 DIAGNOSIS — I25.119 ATHEROSCLEROTIC HEART DISEASE OF NATIVE CORONARY ARTERY WITH UNSPECIFIED ANGINA PECTORIS: ICD-10-CM

## 2021-08-13 DIAGNOSIS — Z79.01 LONG TERM (CURRENT) USE OF ANTICOAGULANTS: ICD-10-CM

## 2021-08-13 DIAGNOSIS — I50.810 OTHER SECONDARY PULMONARY HYPERTENSION: ICD-10-CM

## 2021-08-13 DIAGNOSIS — I11.0 HYPERTENSIVE HEART DISEASE WITH HEART FAILURE: ICD-10-CM

## 2021-08-13 DIAGNOSIS — I27.29 OTHER SECONDARY PULMONARY HYPERTENSION: ICD-10-CM

## 2021-08-13 PROCEDURE — 99215 OFFICE O/P EST HI 40 MIN: CPT

## 2021-08-13 RX ORDER — APIXABAN 2.5 MG/1
2.5 TABLET, FILM COATED ORAL TWICE DAILY
Refills: 0 | Status: ACTIVE | COMMUNITY

## 2021-08-13 RX ORDER — FUROSEMIDE 40 MG/1
40 TABLET ORAL DAILY
Qty: 90 | Refills: 1 | Status: ACTIVE | COMMUNITY
Start: 2020-02-26 | End: 1900-01-01

## 2021-08-13 RX ORDER — FLUTICASONE PROPIONATE 50 MCG
50 SPRAY, SUSPENSION NASAL
Refills: 0 | Status: ACTIVE | COMMUNITY

## 2021-08-13 RX ORDER — BENZONATATE 100 MG/1
100 CAPSULE ORAL 3 TIMES DAILY
Refills: 0 | Status: ACTIVE | COMMUNITY

## 2021-08-13 RX ORDER — WARFARIN 5 MG/1
5 TABLET ORAL
Qty: 90 | Refills: 0 | Status: DISCONTINUED | COMMUNITY
Start: 2017-12-23 | End: 2021-08-13

## 2021-08-13 NOTE — PHYSICAL EXAM
[General Appearance - Well Developed] : well developed [Normal Appearance] : normal appearance [Well Groomed] : well groomed [General Appearance - Well Nourished] : well nourished [No Deformities] : no deformities [General Appearance - In No Acute Distress] : no acute distress [Normal Conjunctiva] : the conjunctiva exhibited no abnormalities [No Oral Pallor] : no oral pallor [Normal Jugular Venous A Waves Present] : normal jugular venous A waves present [Normal Jugular Venous V Waves Present] : normal jugular venous V waves present [No Jugular Venous Nicole A Waves] : no jugular venous nicole A waves [Respiration, Rhythm And Depth] : normal respiratory rhythm and effort [Exaggerated Use Of Accessory Muscles For Inspiration] : no accessory muscle use [Heart Sounds] : normal S1 and S2 [Veins - Varicosity Changes] : no varicosital changes were noted in the lower extremities [Abdomen Soft] : soft [FreeTextEntry1] : walks with a walker [Nail Clubbing] : no clubbing of the fingernails [Cyanosis, Localized] : no localized cyanosis [] : no rash [Oriented To Time, Place, And Person] : oriented to person, place, and time [Affect] : the affect was normal [Mood] : the mood was normal [No Anxiety] : not feeling anxious

## 2021-08-13 NOTE — REVIEW OF SYSTEMS
[Weight Gain (___ Lbs)] : [unfilled] ~Ulb weight gain [Feeling Fatigued] : feeling fatigued [SOB] : shortness of breath [Dyspnea on exertion] : dyspnea during exertion [Chest Discomfort] : no chest discomfort [Lower Ext Edema] : lower extremity edema [Leg Claudication] : no intermittent leg claudication [Palpitations] : no palpitations [Orthopnea] : orthopnea [PND] : no PND [Syncope] : no syncope [Joint Pain] : joint pain [Joint Stiffness] : joint stiffness [Tremor] : a tremor was seen [Confusion] : confusion was observed [Negative] : Heme/Lymph

## 2021-08-13 NOTE — DISCUSSION/SUMMARY
[FreeTextEntry1] : #1  Acute on chronic heart failure secondary to pulmonary pretension/right ventricular failure in presence of significant valvular heart disease.  Not a candidate for invasive evaluation management\par Increase furosemide to twice daily for next 4 days.  Evaluation by nurse at home and appropriate management based on the\par Decrease fluid intake\par Low-salt diet\par Close follow-up on weight\par Discussed with her and her caretaker\par #2 chronic atrial fibrillation. Weight is stable. Continue diltiazem 120 mg c on low-dose Eliquis.  Watch for bleeding.  She is on high risk medications without any significant toxicity.  Follow CBC BMP closely.\par #3 hypertensive heart disease.  Heart failure preserved EF.  CKD.  Continue present medications.\par #4  Nonrheumatic mitral regurgitation. Aortic stenosis.  Preserved systolic function. pulmonary hypertension. Moderate tricuspid regurgitation. Clinically stable. Continue low salt diet.  Management of heart failure preserved EF as noted above\par Overall poor short and long-term prognosis.  High risk for MACE\par \par Counseling regarding low saturated fat, salt and carbohydrate intake was reviewed. Active lifestyle and regular. Exercise along with weight management is advised.\par \par I have reviewed above at length. I answered all the questions. Patient verbalized understanding\par Thank you very much for allowing me to participate in your patient's care. Please feel free to call me for any questions.\par Follow-up will be done as needed.

## 2021-08-13 NOTE — REASON FOR VISIT
[Symptom and Test Evaluation] : symptom and test evaluation [Cardiac Failure] : cardiac failure [Arrhythmia/ECG Abnorrmalities] : arrhythmia/ECG abnormalities [Structural Heart and Valve Disease] : structural heart and valve disease [Hyperlipidemia] : hyperlipidemia [Hypertension] : hypertension [Coronary Artery Disease] : coronary artery disease [Formal Caregiver] : formal caregiver [FreeTextEntry1] : 89-year-old female was seen in the office for follow-up consultation after recent hospital admission for acute on chronic heart failure of preserved LV ejection fraction in presence of significant pulmonary hypertension, valvular heart disease, chronic atrial fibrillation, hypertensive heart and renal disease, hypothyroidism, hyperlipidemia with home oxygen use.\par Her edema is remaining stable.  Though increased as per caretaker as she has been drinking lots of tea and fluids.\par She has constant oxygen use with chronic now respiratory distress at baseline and worsened with activity\par She has no cough fever or chills\par She has no chest pain significant palpitation dizziness or lightheadedness.\par

## 2021-08-13 NOTE — ASSESSMENT
[FreeTextEntry1] : past tests for reference: \par \par Carotid Doppler study. In October 4, 2017. Nonobstructive disease.\par Echocardiogram  October 4, 2017. Preserved ejection fraction. Mild worsening of aortic stenosis and increased pulmonary pressures. Stable mitral regurgitation\par \par  Reviewed labs from July 7, 2017. INR 2.7. CBC is stable. Creatinine 0.98. Sodium 139. Potassium 5.0.\par \par Echocardiogram.  8/5/2015 EF 55%.  Biatrial enlargement.  Right ventricular enlargement.  Mild mitral regurgitation mild to moderate tricuspid regurgitation mild aortic stenosis.  Overall no significant change compared to before.\par \par Echocardiogram 9/7/2016 EF 55%. Biatrial enlargement. Right ventricular enlargement. Normal LV RV function. Mild mitral regurgitation. Moderate tricuspid consideration. Mean aortic gradient 10 mmHg. Aortic valve area 1.37 m².\par \par On April 25 2018 ndication atrial fibrillation. Interpretation by me. Her EKG. Atrial fibrillation. Biventricular hypertrophy. ST-T wave changes, nonspecific.\par \par Reviewed on April 24, 2019\par EKG April 24, 2019. Atrial fibrillation, LVH. Premature ventricular beat. Anteroseptal infarct of undetermined age. Nonspecific ST-T changes.\par Echocardiogram. April 24, 2019 and ejection fraction 65%. Severe aortic stenosis. Dilated aortic root. Biatrial enlargement. Mild pulmonary hypertension.\par \par Reviewed January 17, 2020\par EKG from outside office showed atrial fibrillation stable ventricular rate LVH inferolateral ST-T changes suggestive of ischemia.  No significant new changes\par Labs have not been done recently\par \par Reviewed on February 26, 2020\par Echocardiogram.  LV ejection fraction around 50%.  Right ventricular right atrial enlargement.  Pulmonary hypertension.\par Holter monitor January 17, 2020 atrial fibrillation.  Maximum pauses of 2 to 2.2 seconds.\par \par EKG 6/18/21: Afib at 107 bpm, QTc 436 ms, RAD, PVC, PRWP, nonspecific ST-T wave abnormalities \par \par Echo 6/18/21: EF 65%. Mod MR. Mod to severe AS. Severely dilated LA. Mild global LV dysfunction, likely to afib.l Tachycardic during study. Peak LVOT 2mmHg. Afib with DD and elevated left atrial filling pressures. Severe TAE. RVE with normal RVSF. Mild pulm pressures. Large BL pleural effusions. Compared with echo 2/26/20, progression of AS and large BL pleural effusion.\par Carotid 6/18/21: Mild nonobstructive carotid dz BL.\par \par Zio 4/30/21-5/6/21: AF burden 100%, rates  bpm, average HR 84 bpm. 4 episodes of NSVT vs abberancy, longest 15 beats in duration. PVC burden 1%. Rare SVE couplets. V bigem and v trigem present.\par \par Labs 4/19/21: Gluc 120, Cr 0.92, Na 141, K 3.9, Ca 9\par \par Labs 3/15/21: WBC 5.89, hgb 13.9, HCT 43.5, plt 193.\par \par Pleural fluid 3/12/21: Negative for malignancy. Reactive mesothelial cells in a background of macrophages, chronic inflammatory cells, and blood.\par \par Technically successful thoracentesis 3/12/21.\par \par CXR 3/11/21: Cardiomegaly. BL pleural effusions likely moderate. CHF is suspected. There are diffuse markings seen throughout both lung fields likely representing chronic changes. No pneumo.\par \par Labs 3/10/21: K 4.5, Cr 0.93, Mag 1.7, AST 28, ALT 23, Trop 0.22, WBC 5.38, Hgb 12.3, HCT 40.8, plt 166, \par \par Echo 3/10/21: EF 50%. Severely dilated LA and right. Mildly reduced RVSF. Moderate AS. Mild MS. Mod MR. Mod TR. Mild IN. Moderately elevated PASP. Possible PFO or small atrial defect. Right pleural effusion.\par \par CXR 3/9/21: Cardiomegaly with BL pleural effusions with PVC suggest CHF / fluid overload; concurrent PNA in the atelectatic lower lobes cannot be excluded.\par Holter 1/17/20: Afib  bpm. Average HR 84 bpm. Pauses 2-2.2 seconds noted. PVC rare to occasional. \par

## 2021-10-04 RX ORDER — DILTIAZEM HYDROCHLORIDE 120 MG/1
120 CAPSULE, EXTENDED RELEASE ORAL
Qty: 90 | Refills: 3 | Status: ACTIVE | COMMUNITY
Start: 2017-11-22 | End: 1900-01-01

## 2021-10-04 RX ORDER — METOPROLOL SUCCINATE 100 MG/1
100 TABLET, EXTENDED RELEASE ORAL
Qty: 90 | Refills: 3 | Status: ACTIVE | COMMUNITY
Start: 2021-04-16 | End: 1900-01-01